# Patient Record
Sex: MALE | Race: WHITE
[De-identification: names, ages, dates, MRNs, and addresses within clinical notes are randomized per-mention and may not be internally consistent; named-entity substitution may affect disease eponyms.]

---

## 2018-12-21 NOTE — ER DOCUMENT REPORT
ED Medical Screen (RME)





- General


Chief Complaint: Laceration


Stated Complaint: WRIST INJURY


Time Seen by Provider: 12/21/18 10:10


Notes: 





23-year-old male patient cut left volar wrist with a  that bounced back 

and hit the wrist.  Brief exam of the wound shows no deep structures involved.


Patient does not know if his tetanus status is up-to-date.








I have greeted and performed a rapid initial assessment of this patient.  A 

comprehensive ED assessment and evaluation of the patient, analysis of test 

results and completion of the medical decision making process will be conducted 

by additional ED providers.


TRAVEL OUTSIDE OF THE [place] IN LAST 30 DAYS: No





- Related Data


Allergies/Adverse Reactions: 


                                        





Penicillins Allergy (Verified 12/21/18 09:57)


   











Past Medical History





- Social History


Chew tobacco use (# tins/day): No


Frequency of alcohol use: None


Drug Abuse: None


Renal/ Medical History: Denies: Hx Peritoneal Dialysis





Physical Exam





- Vital signs


Vitals: 





                                        











Temp Pulse Resp BP Pulse Ox


 


 97.6 F   99   20   133/78 H  97 


 


 12/21/18 10:01  12/21/18 10:01  12/21/18 10:01  12/21/18 10:01  12/21/18 10:01














Course





- Vital Signs


Vital signs: 





                                        











Temp Pulse Resp BP Pulse Ox


 


 97.6 F   99   20   133/78 H  97 


 


 12/21/18 10:01  12/21/18 10:01  12/21/18 10:01  12/21/18 10:01  12/21/18 10:01

## 2018-12-21 NOTE — ER DOCUMENT REPORT
ED General





- General


Chief Complaint: Laceration


Stated Complaint: WRIST INJURY


Time Seen by Provider: 12/21/18 10:10


Mode of Arrival: Ambulatory


Information source: Patient


Notes: 





23-year-old male with no reported past medical history presents with a 

laceration to his left wrist.  Patient states that just prior to arrival he cut 

his wrist on a .  Unclear whether the patient's tetanus is 

up-to-date so this was ordered.  Patient denies any pain, numbness.  He is 

declining sutures and requesting Dermabond.  I did advise him that the area 

where the laceration is is over the wrist joint and would more likely healed 

with sutures due to the movement of the wrist.  Patient declining.


TRAVEL OUTSIDE OF THE [place] IN LAST 30 DAYS: No





- HPI


Onset: Just prior to arrival


Onset/Duration: Sudden


Quality of pain: Achy


Severity: Mild


Associated symptoms: denies: Chills, Fever, Nausea, Vomiting, Shortness of 

breath


Exacerbated by: Movement


Relieved by: Remaining still


Similar symptoms previously: No


Recently seen / treated by doctor: No





- Related Data


Allergies/Adverse Reactions: 


                                        





Penicillins Allergy (Verified 12/21/18 09:57)


   











Past Medical History





- General


Information source: Patient, [facility] Records





- Social History


Smoking Status: Current Every Day Smoker


Cigarette use (# per day): Yes - 10


Chew tobacco use (# tins/day): No


Smoking Education Provided: Yes - Smoking cessation counseling was provided for 

4 minutes at the bedside 


Frequency of alcohol use: None


Drug Abuse: None


Lives with: Alone


Family History: Reviewed & Not Pertinent


Patient has suicidal ideation: No


Patient has homicidal ideation: No





- Medical History


Medical History: Negative


Renal/ Medical History: Denies: Hx Peritoneal Dialysis





Review of Systems





- Review of Systems


Notes: 





REVIEW OF SYSTEMS:


CONSTITUTIONAL :  Denies fever,  chills, or sweats.  Denies recent illness. 

Denies weight loss, recent hospitalizations. 


EENT: Denies visual changes, eye pain.  Denies sore throat, oral lesions, 

difficulty swallowing.


CARDIOVASCULAR:  Denies chest pain.  Denies palpitations. Denies lower extremity

edema.


RESPIRATORY:  Denies cough. Denies shortness of breath, wheezing.


GASTROINTESTINAL:  Denies abdominal pain or distention.  Denies nausea, 

vomiting, or diarrhea.  Denies blood in vomitus, stools, or per rectum.  Denies 

black, tarry stools.  Denies constipation.  


GENITOURINARY:  Denies difficulty urinating, painful urination,  frequency, 

blood in urine, testicular pain or penile discharge.


MUSCULOSKELETAL:  Denies back or neck pain or stiffness.  Denies joint pain or 

swelling.


SKIN:   Denies rash.


HEMATOLOGIC :   Denies easy bruising or bleeding.


LYMPHATIC:  Denies swollen glands.


NEUROLOGICAL:  Denies confusion or altered mental status.  Denies loss of 

consciousness.  Denies dizziness or lightheadedness.  Denies headache.  Denies 

weakness or paralysis.  Denies problems difficulty with ambulation, slurred 

speech.  Denies sensory loss, numbness, or tingling.  Denies seizures.


PSYCHIATRIC:  Denies anxiety or stress.  Denies depression, suicidal ideation, 

or





Physical Exam





- Vital signs


Vitals: 


                                        











Temp Pulse Resp BP Pulse Ox


 


 97.6 F   99   20   133/78 H  97 


 


 12/21/18 10:01  12/21/18 10:01  12/21/18 10:01  12/21/18 10:01  12/21/18 10:01














- Notes


Notes: 





PHYSICAL EXAMINATION:





GENERAL: Well-appearing, well-nourished and in no acute distress.





HEAD: Atraumatic, normocephalic.





EYES: Pupils equal round and reactive to light, extraocular movements intact, 

sclera anicteric, conjunctiva are normal.





ENT: Nares patent, oropharynx clear without exudates.  Moist mucous membranes.





NECK: Normal range of motion, supple without lymphadenopathy





LUNGS: Breath sounds clear to auscultation bilaterally and equal.  No wheezes 

rales or rhonchi.





HEART: Regular rate and rhythm without murmurs





ABDOMEN: Soft, nontender, nondistended abdomen.  No guarding, no rebound.  No 

masses appreciated.





Musculoskeletal: Normal range of motion, no pitting or edema.  No cyanosis.  4 

cm laceration to the volar aspect of the left wrist proximal to the first MCP.  

No active bleeding.  Symmetrically palpable radial and ulnar pulses.  Cap refill

less than 2 seconds on all digits.  Intact sensation to light touch of the 

radial, median and ulnar nerves demonstrated by testing in the dorsal webspace 

of the thumb the distal palmar aspect of the index finger and lateral surface of

the fifth finger.  Two-point discrimination intact to 5 mm of discrimination in 

the affected digit.  Intact motor function of the radial median and ulnar nerves

demonstrated by strength of extension of the isolated distal joint of the index 

finger, hand , and spreading of the second through fifth digits.  Intact 

recurrent median nerve as demonstrated by ability to move down fully through 

opposition, abduction and flexion.  No snuffbox tenderness.





NEUROLOGICAL: Cranial nerves grossly intact.  Normal speech, normal gait.  

Normal sensory, motor exams 





PSYCH: Normal mood, normal affect.





SKIN: 4 cm laceration to the volar aspect of the left wrist proximal to the 

first MCP.  No active bleeding.





Course





- Re-evaluation


Re-evalutation: 





12/21/18 21:37


23-year-old male presents being cut by a .  Patient unsure of this.

 Vital signs reviewed and within normal limits upon arrival.  Patient does not 

appear toxic or dehydrated.  He is in no acute distress.  Previous nursing notes

and medical records reviewed.  Exam significant for a 4 cm superficial linear 

laceration on the volar aspect at the base of the thumb.  Patient declining 

sutures, requesting glue or tape.  I did discuss with the patient that this is a

high tension area and not with excessive movement would be open.  Wound edges 

were approximated with Dermabond and Steri-Strips were placed on top.  Patient 

advised to quit his wrist for 48 hours.  Wrist immobilizer provided.  Patient 

discharged home with wound care instructions.  Expresses understanding to return

if he notices pus from the wound or worsening pain or redness.  Or any other 

symptoms that concern him.





- Vital Signs


Vital signs: 


                                        











Temp Pulse Resp BP Pulse Ox


 


 98.8 F   95   20   130/71 H  93 


 


 12/21/18 11:09  12/21/18 11:09  12/21/18 11:09  12/21/18 11:09  12/21/18 11:09














Procedures





- Laceration/Wound Repair


  ** Left Wrist


Time completed: 12:00


Wound length (cm): 4


Wound's Depth, Shape: Superficial, Linear


Laceration pre-procedure: Sterile PPE donned, Betadine prep applied


Wound explored: Clean


Irrigated w/ Saline (mLs): 1,000 - Copious irrigation with tap water


Wound Repaired With: Dermabond


Post-procedure NV exam normal: Yes


Complications: No





Discharge





- Discharge


Clinical Impression: 


Laceration of wrist, left


Qualifiers:


 Encounter type: initial encounter Qualified Code(s): S61.512A - Laceration 

without foreign body of left wrist, initial encounter





Condition: Good


Disposition: HOME, SELF-CARE


Instructions:  Laceration Care ([facility]), Tetanus Immunization Given ([facility])


Additional Instructions: 


You opted to fix your laceration with glue and Steri-Strips.  I had a did 

discuss with you that this was not the best option due to where the laceration 

is.  Please do not get the area wet for 48 hours.  Remain in your dressing and 

splint for 48 hours as well.  Please look for signs of infection including pus 

from the wound, redness around the wound.  Please return if any of these occur 

or you develop a fever or any other concerning symptoms.


Forms:  Elevated Blood Pressure

## 2019-12-26 ENCOUNTER — HOSPITAL ENCOUNTER (EMERGENCY)
Dept: HOSPITAL 62 - ER | Age: 24
LOS: 1 days | Discharge: HOME | End: 2019-12-27
Payer: SELF-PAY

## 2019-12-26 DIAGNOSIS — S91.331A: Primary | ICD-10-CM
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
## 2019-12-26 PROCEDURE — 99283 EMERGENCY DEPT VISIT LOW MDM: CPT

## 2019-12-26 NOTE — ER DOCUMENT REPORT
HPI





- HPI


Time Seen by Provider: 12/26/19 21:49


Notes: 





Otherwise healthy 24-year-old male presenting with puncture wound to his left 

foot.  Patient reports this occurred 2 days ago.  He reports a lillie nail went 

through his boot and into his foot.  He reports his Tdap was up-to-date a couple

of months ago.








Past Medical History





- General


Information source: Patient





- Social History


Smoking Status: Never Smoker


Family History: Reviewed & Not Pertinent





- Medical History


Medical History: Negative


Renal/ Medical History: Denies: Hx Peritoneal Dialysis





[facility] Provider Document





- CONSTITUTIONAL


Notes: 





PHYSICAL EXAMINATION:





GENERAL: Well-appearing, well-nourished and in no acute distress.





HEAD: Atraumatic, normocephalic.





EYES: Pupils equal round extraocular movements intact,  conjunctiva are normal.





ENT: Nares patent





NECK: Normal range of motion





LUNGS: No respiratory distress





Musculoskeletal: Normal range of motion





NEUROLOGICAL:  Normal speech, normal gait. 





PSYCH: Normal mood, normal affect.





SKIN: Puncture wound noted to sole of left foot, no surrounding erythema.





- INFECTION CONTROL


TRAVEL OUTSIDE OF THE [place] IN LAST 30 DAYS: No





Course





- Re-evaluation


Re-evalutation: 





We will obtain x-ray to eval for retained foreign body.  Plan to discharge 

patient home on antibiotics.








- Vital Signs


Vital signs: 


                                        











Temp Pulse Resp BP Pulse Ox


 


 98.3 F   79   16   148/83 H  98 


 


 12/26/19 19:29  12/26/19 19:29  12/26/19 19:29  12/26/19 19:29  12/26/19 19:29














Discharge





- Discharge


Clinical Impression: 


Puncture wound of foot


Qualifiers:


 Encounter type: initial encounter Laterality: right Qualified Code(s): S91.331A

- Puncture wound without foreign body, right foot, initial encounter





Condition: Stable


Disposition: HOME, SELF-CARE


Additional Instructions: 


You are being put on an antibiotic to prevent infection.  Please watch the area 

closely for signs of infection such as increased redness, pain, swelling, 

drainage from the area.  Take antibiotics as prescribed.  Ibuprofen or Tylenol 

for pain.





Prescriptions: 


Ciprofloxacin HCl [Cipro 500 mg Tablet] 500 mg PO BID #10 tablet


Forms:  Return to Work

## 2019-12-26 NOTE — RADIOLOGY REPORT (SQ)
EXAM DESCRIPTION: 



XR FOOT 3 OR MORE VIEWS



COMPLETED DATE/TME:  12/26/2019 21:51



CLINICAL HISTORY: 



24 years, Male, eval for retained FB, puncture wound



COMPARISON:

None.



NUMBER OF VIEWS:

3



TECHNIQUE:

3 view left foot



LIMITATIONS:

None.



FINDINGS:



Negative for fracture or dislocation. No radiopaque foreign body



IMPRESSION:



Negative exam

 



copyright 2011 [employer]- All Rights Reserved

## 2019-12-27 VITALS — DIASTOLIC BLOOD PRESSURE: 87 MMHG | SYSTOLIC BLOOD PRESSURE: 141 MMHG
